# Patient Record
Sex: MALE | Race: WHITE
[De-identification: names, ages, dates, MRNs, and addresses within clinical notes are randomized per-mention and may not be internally consistent; named-entity substitution may affect disease eponyms.]

---

## 2021-02-03 ENCOUNTER — APPOINTMENT (OUTPATIENT)
Dept: UROLOGY | Facility: CLINIC | Age: 46
End: 2021-02-03
Payer: COMMERCIAL

## 2021-02-03 VITALS — TEMPERATURE: 98 F | WEIGHT: 170 LBS | BODY MASS INDEX: 26.68 KG/M2 | HEIGHT: 67 IN

## 2021-02-03 DIAGNOSIS — Z80.41 FAMILY HISTORY OF MALIGNANT NEOPLASM OF OVARY: ICD-10-CM

## 2021-02-03 DIAGNOSIS — Z78.9 OTHER SPECIFIED HEALTH STATUS: ICD-10-CM

## 2021-02-03 DIAGNOSIS — R39.9 UNSPECIFIED SYMPTOMS AND SIGNS INVOLVING THE GENITOURINARY SYSTEM: ICD-10-CM

## 2021-02-03 DIAGNOSIS — Z80.42 FAMILY HISTORY OF MALIGNANT NEOPLASM OF PROSTATE: ICD-10-CM

## 2021-02-03 PROBLEM — Z00.00 ENCOUNTER FOR PREVENTIVE HEALTH EXAMINATION: Status: ACTIVE | Noted: 2021-02-03

## 2021-02-03 PROCEDURE — 99203 OFFICE O/P NEW LOW 30 MIN: CPT

## 2021-02-03 PROCEDURE — 99072 ADDL SUPL MATRL&STAF TM PHE: CPT

## 2021-02-03 NOTE — REVIEW OF SYSTEMS
[Fever] : no fever [Nasal Discharge] : no nasal discharge [Chest Pain] : no chest pain [Shortness Of Breath] : no shortness of breath [Abdominal Pain] : no abdominal pain [Diarrhea] : no diarrhea [Dysuria] : no dysuria [Confused] : no confusion

## 2021-02-03 NOTE — ASSESSMENT
[Urinary Symptom or Sign (788.99\R39.89)] : implantation [FreeTextEntry1] : Family history of prostate cancer we'll send new PSA. Return to office yearly provided PSA okay

## 2021-02-03 NOTE — PHYSICAL EXAM
[General Appearance - In No Acute Distress] : no acute distress [Edema] : no peripheral edema [] : no respiratory distress [Respiration, Rhythm And Depth] : normal respiratory rhythm and effort [Prostate Tenderness] : the prostate was not tender [No Prostate Nodules] : no prostate nodules [Prostate Size ___ (0-4)] : prostate size [unfilled] (scale: 0-4) [Normal Station and Gait] : the gait and station were normal for the patient's age [Oriented To Time, Place, And Person] : oriented to person, place, and time [Not Anxious] : not anxious

## 2021-02-03 NOTE — HISTORY OF PRESENT ILLNESS
[FreeTextEntry1] : 45-year-old with family history of prostate cancer. He had a benign biopsy in 2012 when his PSA was 2.5. He had a 31 cc prostate. PSA in 2017 was 2.6. He has not had one since. He has occasional daytime frequency but other than that no urinary symptoms. He has no flank pain, no hematuria, no dysuria, no nocturia, has a good urinary flow. Prostate symptom score 1/35.

## 2021-02-09 ENCOUNTER — NON-APPOINTMENT (OUTPATIENT)
Age: 46
End: 2021-02-09

## 2021-02-09 DIAGNOSIS — R97.20 ELEVATED PROSTATE, SPECIFIC ANTIGEN [PSA]: ICD-10-CM

## 2021-02-09 LAB — PSA SERPL-MCNC: 3.41 NG/ML

## 2021-02-23 ENCOUNTER — LABORATORY RESULT (OUTPATIENT)
Age: 46
End: 2021-02-23

## 2021-02-23 ENCOUNTER — OUTPATIENT (OUTPATIENT)
Dept: OUTPATIENT SERVICES | Facility: HOSPITAL | Age: 46
LOS: 1 days | Discharge: HOME | End: 2021-02-23

## 2021-02-23 DIAGNOSIS — Z11.59 ENCOUNTER FOR SCREENING FOR OTHER VIRAL DISEASES: ICD-10-CM

## 2021-02-23 LAB — CREAT SERPL-MCNC: 1.1 MG/DL

## 2021-02-26 ENCOUNTER — RESULT REVIEW (OUTPATIENT)
Age: 46
End: 2021-02-26

## 2021-02-26 ENCOUNTER — OUTPATIENT (OUTPATIENT)
Dept: OUTPATIENT SERVICES | Facility: HOSPITAL | Age: 46
LOS: 1 days | Discharge: HOME | End: 2021-02-26
Payer: COMMERCIAL

## 2021-02-26 DIAGNOSIS — R97.20 ELEVATED PROSTATE SPECIFIC ANTIGEN [PSA]: ICD-10-CM

## 2021-02-26 LAB — PSA SERPL-MCNC: 3.83 NG/ML

## 2021-02-26 PROCEDURE — 72197 MRI PELVIS W/O & W/DYE: CPT | Mod: 26

## 2021-03-02 ENCOUNTER — NON-APPOINTMENT (OUTPATIENT)
Age: 46
End: 2021-03-02

## 2021-04-28 LAB — PSA SERPL-MCNC: 3.18 NG/ML

## 2021-04-30 ENCOUNTER — TRANSCRIPTION ENCOUNTER (OUTPATIENT)
Age: 46
End: 2021-04-30

## 2021-05-05 ENCOUNTER — APPOINTMENT (OUTPATIENT)
Dept: UROLOGY | Facility: CLINIC | Age: 46
End: 2021-05-05
Payer: COMMERCIAL

## 2021-05-05 VITALS — BODY MASS INDEX: 26.68 KG/M2 | WEIGHT: 170 LBS | TEMPERATURE: 98.7 F | HEIGHT: 67 IN

## 2021-05-05 PROCEDURE — 99213 OFFICE O/P EST LOW 20 MIN: CPT

## 2021-05-05 PROCEDURE — 99072 ADDL SUPL MATRL&STAF TM PHE: CPT

## 2021-05-05 NOTE — REVIEW OF SYSTEMS
[Fever] : no fever [Feeling Poorly] : not feeling poorly [Feeling Tired] : not feeling tired [Chest Pain] : no chest pain [Cough] : no cough [Constipation] : no constipation [Dysuria] : no dysuria

## 2021-05-05 NOTE — HISTORY OF PRESENT ILLNESS
[FreeTextEntry1] : 46-year-old With family history of prostate cancer. He is a benign biopsy in 2012 and his PSA was 2.5. Prostate was 31 cc none. PSA was 2.6 in 2017.Recently it went as high as 3.8and had an MRI of the prostate which showed no significant lesion. He is now here to discuss repeat PSA which is 3.1.  Prostate was 27 cc on MRI. PSA density 0.11

## 2021-05-05 NOTE — ASSESSMENT
[FreeTextEntry1] : Stable PSA. In light of this and previous negative biopsy, negative MRI and low PSA density recommend continued observation and repeat PSA in 3 months

## 2021-09-08 LAB — PSA SERPL-MCNC: 3.18 NG/ML

## 2021-09-09 ENCOUNTER — APPOINTMENT (OUTPATIENT)
Dept: SURGERY | Facility: CLINIC | Age: 46
End: 2021-09-09
Payer: COMMERCIAL

## 2021-09-09 VITALS
DIASTOLIC BLOOD PRESSURE: 78 MMHG | WEIGHT: 166 LBS | TEMPERATURE: 97.2 F | SYSTOLIC BLOOD PRESSURE: 118 MMHG | BODY MASS INDEX: 26.06 KG/M2 | HEIGHT: 67 IN | HEART RATE: 95 BPM

## 2021-09-09 PROCEDURE — 99202 OFFICE O/P NEW SF 15 MIN: CPT

## 2021-09-09 NOTE — HISTORY OF PRESENT ILLNESS
[de-identified] : The patient is a registered nurse and  who was first told of this mass in the posterior neck about 4 weeks ago. He was unaware of it previously and denies any change in the mass or local symptomatology related to it since then. He was seen by his dermatologist, referred for a sonogram, and then a surgical evaluation here. There is no history of local trauma, infection or insect bite that he can remember.\par \par He has had his Corona virus vaccination in February of this year.

## 2021-09-09 NOTE — ASSESSMENT
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 We discussed the option of observation and surveillance, as opposed to surgical excision for definitive diagnosis. Fine-needle aspiration biopsy was not advised and the rationale for this was discussed. Given his family history he prefers to have this lesion excised and the procedure was explained in full. All his questions were answered and he understands and agrees. A consent was obtained and he will contact the office for scheduling.

## 2021-09-09 NOTE — PHYSICAL EXAM
[Normal Breath Sounds] : Normal breath sounds [Normal Heart Sounds] : normal heart sounds [Normal Rate and Rhythm] : normal rate and rhythm [de-identified] : healthy [de-identified] : no cervical or supraclavicular lymphadenopathy.12 mm diameter discoid subcutaneous mass in the right posterior neck region, slightly firm but mobile without any tenderness or overlying skin changes. No local inflammatory changes are noted.  No axillary adenopathy bilaterally.

## 2021-09-10 ENCOUNTER — APPOINTMENT (OUTPATIENT)
Dept: UROLOGY | Facility: CLINIC | Age: 46
End: 2021-09-10
Payer: COMMERCIAL

## 2021-09-10 VITALS — BODY MASS INDEX: 26.06 KG/M2 | HEIGHT: 67 IN | WEIGHT: 166 LBS

## 2021-09-10 PROCEDURE — 99213 OFFICE O/P EST LOW 20 MIN: CPT

## 2021-10-09 ENCOUNTER — LABORATORY RESULT (OUTPATIENT)
Age: 46
End: 2021-10-09

## 2021-10-12 ENCOUNTER — OUTPATIENT (OUTPATIENT)
Dept: OUTPATIENT SERVICES | Facility: HOSPITAL | Age: 46
LOS: 1 days | Discharge: HOME | End: 2021-10-12

## 2021-10-12 ENCOUNTER — LABORATORY RESULT (OUTPATIENT)
Age: 46
End: 2021-10-12

## 2021-10-12 DIAGNOSIS — Z11.59 ENCOUNTER FOR SCREENING FOR OTHER VIRAL DISEASES: ICD-10-CM

## 2021-10-14 ENCOUNTER — NON-APPOINTMENT (OUTPATIENT)
Age: 46
End: 2021-10-14

## 2021-10-15 ENCOUNTER — RESULT REVIEW (OUTPATIENT)
Age: 46
End: 2021-10-15

## 2021-10-15 ENCOUNTER — OUTPATIENT (OUTPATIENT)
Dept: OUTPATIENT SERVICES | Facility: HOSPITAL | Age: 46
LOS: 1 days | Discharge: HOME | End: 2021-10-15
Payer: COMMERCIAL

## 2021-10-15 VITALS
HEART RATE: 68 BPM | SYSTOLIC BLOOD PRESSURE: 123 MMHG | TEMPERATURE: 96 F | OXYGEN SATURATION: 100 % | RESPIRATION RATE: 17 BRPM | WEIGHT: 166.01 LBS | DIASTOLIC BLOOD PRESSURE: 84 MMHG | HEIGHT: 67 IN

## 2021-10-15 VITALS — SYSTOLIC BLOOD PRESSURE: 116 MMHG | RESPIRATION RATE: 18 BRPM | HEART RATE: 61 BPM | DIASTOLIC BLOOD PRESSURE: 73 MMHG

## 2021-10-15 PROCEDURE — 21555 EXC NECK LES SC < 3 CM: CPT

## 2021-10-15 PROCEDURE — 12032 INTMD RPR S/A/T/EXT 2.6-7.5: CPT | Mod: 59

## 2021-10-15 PROCEDURE — 88304 TISSUE EXAM BY PATHOLOGIST: CPT | Mod: 26

## 2021-10-15 RX ORDER — ACETAMINOPHEN 500 MG
975 TABLET ORAL ONCE
Refills: 0 | Status: DISCONTINUED | OUTPATIENT
Start: 2021-10-15 | End: 2021-10-29

## 2021-10-15 RX ORDER — SODIUM CHLORIDE 9 MG/ML
1000 INJECTION, SOLUTION INTRAVENOUS
Refills: 0 | Status: DISCONTINUED | OUTPATIENT
Start: 2021-10-15 | End: 2021-10-29

## 2021-10-15 RX ORDER — HYDROMORPHONE HYDROCHLORIDE 2 MG/ML
0.5 INJECTION INTRAMUSCULAR; INTRAVENOUS; SUBCUTANEOUS
Refills: 0 | Status: DISCONTINUED | OUTPATIENT
Start: 2021-10-15 | End: 2021-10-15

## 2021-10-15 RX ADMIN — SODIUM CHLORIDE 100 MILLILITER(S): 9 INJECTION, SOLUTION INTRAVENOUS at 12:15

## 2021-10-15 NOTE — BRIEF OPERATIVE NOTE - NSICDXBRIEFPROCEDURE_GEN_ALL_CORE_FT
PROCEDURES:  Surgical removal of benign lesion of back, 1.1 cm to 2.0 cm in diameter 15-Oct-2021 11:45:12  Jas Singh

## 2021-10-15 NOTE — ASU DISCHARGE PLAN (ADULT/PEDIATRIC) - ASU DC SPECIAL INSTRUCTIONSFT
You are being discharged from [facility]. Please call to schedule a follow up appointment with Dr. Guidry as previously discussed in 2 week. For pain please take Tylenol and ibuprofen as needed every 6-8 hours. You may remove your dressing in 48 hours, please keep your Steri-strips in place. You may shower, but do not submerge in a water bath. Please avoid heavy weight lifting for the next 4-6 weeks.  If you have any further questions about your care, please do not hesitate to contact Dr. Guidry's office or return to the Emergency Department.

## 2021-10-15 NOTE — BRIEF OPERATIVE NOTE - CONDITION POST OP
68 Stable Pt with atypical chest pain and no risk factors.  will check labs, ekg, cxr, CTA and reassess.

## 2021-10-15 NOTE — ASU DISCHARGE PLAN (ADULT/PEDIATRIC) - CARE PROVIDER_API CALL
Yeyo Guidry)  Surgery  [address] [place] [address], [address] [place] [address]  Phone: [phone]  Fax: [phone]  Follow Up Time: 2 weeks

## 2021-10-18 LAB — SURGICAL PATHOLOGY STUDY: SIGNIFICANT CHANGE UP

## 2021-10-22 DIAGNOSIS — D17.0 BENIGN LIPOMATOUS NEOPLASM OF SKIN AND SUBCUTANEOUS TISSUE OF HEAD, FACE AND NECK: ICD-10-CM

## 2021-10-22 DIAGNOSIS — N40.0 BENIGN PROSTATIC HYPERPLASIA WITHOUT LOWER URINARY TRACT SYMPTOMS: ICD-10-CM

## 2021-10-25 ENCOUNTER — APPOINTMENT (OUTPATIENT)
Dept: SURGERY | Facility: CLINIC | Age: 46
End: 2021-10-25
Payer: COMMERCIAL

## 2021-10-25 VITALS
TEMPERATURE: 97.8 F | DIASTOLIC BLOOD PRESSURE: 76 MMHG | BODY MASS INDEX: 26.21 KG/M2 | SYSTOLIC BLOOD PRESSURE: 124 MMHG | OXYGEN SATURATION: 98 % | HEART RATE: 69 BPM | WEIGHT: 167 LBS | HEIGHT: 67 IN

## 2021-10-25 DIAGNOSIS — R22.1 LOCALIZED SWELLING, MASS AND LUMP, NECK: ICD-10-CM

## 2021-10-25 PROCEDURE — 99024 POSTOP FOLLOW-UP VISIT: CPT

## 2021-10-25 NOTE — HISTORY OF PRESENT ILLNESS
[de-identified] : The patient returns for his first postop visit following excision of the lipoma of the base of the right neck posteriorly. He has no specific complaints and feels well overall

## 2021-10-25 NOTE — ASSESSMENT
[FreeTextEntry1] : No postoperative problems noted, local care and activity were reviewed. He will avoid massaging the area and use warm moist compresses. No antibiotics will be prescribed at this time. Appropriate precautions were advised that he will return here as need be. All his questions were answered.

## 2021-10-25 NOTE — PHYSICAL EXAM
[de-identified] : Small transverse incision at the base of the right neck posteriorly is clean and dry. There is mild erythema and edema, but he admits to massaging this area to try to flatten it. The wound is not fluctuant or draining.

## 2021-12-08 ENCOUNTER — LABORATORY RESULT (OUTPATIENT)
Age: 46
End: 2021-12-08

## 2021-12-14 ENCOUNTER — APPOINTMENT (OUTPATIENT)
Dept: UROLOGY | Facility: CLINIC | Age: 46
End: 2021-12-14
Payer: COMMERCIAL

## 2021-12-14 DIAGNOSIS — R97.20 ELEVATED PROSTATE, SPECIFIC ANTIGEN [PSA]: ICD-10-CM

## 2021-12-14 PROCEDURE — 99214 OFFICE O/P EST MOD 30 MIN: CPT

## 2021-12-14 NOTE — ASSESSMENT
[FreeTextEntry1] : PSA elevated above his baseline.  Patient will repeat this while abstaining from any ejaculation for a few days prior.  We will get also a creatinine in preparation for possible MRI.  Return to office to discuss results

## 2021-12-14 NOTE — PHYSICAL EXAM
[Prostate Tenderness] : the prostate was not tender [General Appearance - In No Acute Distress] : no acute distress [No Prostate Nodules] : no prostate nodules [Prostate Size ___ (0-4)] : prostate size [unfilled] (scale: 0-4) [Oriented To Time, Place, And Person] : oriented to person, place, and time [Normal Station and Gait] : the gait and station were normal for the patient's age
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
## 2021-12-14 NOTE — REVIEW OF SYSTEMS
[Fever] : no fever [Sore Throat] : no sore throat [Cough] : no cough [Constipation] : no constipation [Confused] : no confusion

## 2022-01-16 LAB
CREAT SERPL-MCNC: 1 MG/DL
PSA SERPL-MCNC: 3.81 NG/ML

## 2022-01-18 ENCOUNTER — APPOINTMENT (OUTPATIENT)
Dept: UROLOGY | Facility: CLINIC | Age: 47
End: 2022-01-18
Payer: COMMERCIAL

## 2022-01-18 VITALS — HEIGHT: 67 IN | WEIGHT: 163 LBS | BODY MASS INDEX: 25.58 KG/M2

## 2022-01-18 PROCEDURE — 99214 OFFICE O/P EST MOD 30 MIN: CPT

## 2022-01-18 NOTE — HISTORY OF PRESENT ILLNESS
[FreeTextEntry1] : 46-year-old with family history of prostate cancer, benign biopsy of his prostate in 2012 and his PSA was 2.5 and his prostate was 31 cc.  His PSA increased to 3.8 in February 2021 and an MRI of the prostate showed no significant lesions.  PSA density was 0.11.  4 months ago his PSA returned to 3.1.  Recently his PSA increased to 5.1 and now on repeat is 3.8.  No change in urination.  Creatinine 1.0.

## 2022-01-18 NOTE — REVIEW OF SYSTEMS
[Feeling Poorly] : not feeling poorly [Feeling Tired] : not feeling tired [Sore Throat] : no sore throat [Chest Pain] : no chest pain [Cough] : no cough [Constipation] : no constipation [Confused] : no confusion

## 2022-01-18 NOTE — ASSESSMENT
[FreeTextEntry1] : Improved PSA.  PSA density remains below 0.15.  History of benign biopsy of the prostate and benign MRIs.  PSA is stable compared to the number when he had a benign MRI in February.  All of the above support benign prostate.  Of concern is his family history of prostate cancer so it needs to watch closely.  We will repeat PSA in 4 months to trend it

## 2022-02-08 ENCOUNTER — APPOINTMENT (OUTPATIENT)
Dept: UROLOGY | Facility: CLINIC | Age: 47
End: 2022-02-08

## 2022-04-26 LAB
CREAT SERPL-MCNC: 1.1 MG/DL
EGFR: 84 ML/MIN/1.73M2

## 2022-04-27 LAB — PSA SERPL-MCNC: 3.63 NG/ML

## 2022-04-29 ENCOUNTER — APPOINTMENT (OUTPATIENT)
Dept: UROLOGY | Facility: CLINIC | Age: 47
End: 2022-04-29
Payer: COMMERCIAL

## 2022-04-29 VITALS — HEIGHT: 67 IN | BODY MASS INDEX: 25.58 KG/M2 | WEIGHT: 163 LBS

## 2022-04-29 DIAGNOSIS — R97.20 ELEVATED PROSTATE, SPECIFIC ANTIGEN [PSA]: ICD-10-CM

## 2022-04-29 PROCEDURE — 99214 OFFICE O/P EST MOD 30 MIN: CPT

## 2022-04-29 NOTE — HISTORY OF PRESENT ILLNESS
[FreeTextEntry1] : 47-year-old with history of BPH, benign prostate biopsy in 2012 when his PSA was 2.5 and his prostate was 31 cc.  Family history of prostate cancer.  In 2021 when his PSA was 3.8 he underwent an MRI of the prostate which showed no significant lesions and a PSA density of 0.11.  PSA fluctuated up to 5.1 4 months ago and on repeat was 3.8.  Its been as low as 3.1 8 months ago.  Current PSA 3.6.  Creatinine 1.1

## 2022-04-29 NOTE — ASSESSMENT
[FreeTextEntry1] : Stable PSA.  Favorable PSA density.  History of negative MRI for suspicious lesions and also benign prostate biopsy.  We will continue to monitor PSA and repeat in 6 months

## 2022-04-29 NOTE — REVIEW OF SYSTEMS
[Fever] : no fever [Sore Throat] : no sore throat [Shortness Of Breath] : no shortness of breath [Confused] : no confusion

## 2022-10-28 LAB — PSA SERPL-MCNC: 3.61 NG/ML

## 2022-11-04 ENCOUNTER — APPOINTMENT (OUTPATIENT)
Dept: UROLOGY | Facility: CLINIC | Age: 47
End: 2022-11-04

## 2022-11-04 VITALS
WEIGHT: 163 LBS | TEMPERATURE: 98 F | SYSTOLIC BLOOD PRESSURE: 124 MMHG | DIASTOLIC BLOOD PRESSURE: 78 MMHG | BODY MASS INDEX: 25.58 KG/M2 | HEIGHT: 67 IN

## 2022-11-04 PROCEDURE — 99214 OFFICE O/P EST MOD 30 MIN: CPT

## 2022-11-04 NOTE — HISTORY OF PRESENT ILLNESS
[FreeTextEntry1] : 47-year-old with history of BPH, benign biopsy in 2012 when his PSA was 2.5 and his prostate was 31 cc.  In 2021 when his PSA was 3.8 he underwent an MRI of the prostate which showed no significant lesions and a PSA density of 0.11.  PSA has fluctuated up to 5.1 10 months ago and his lowest 3.1 14 months ago.  6 months ago PSA was 3.6 and current PSA is stable at 3.6.  No urinary complaint.  There is a family history of prostate cancer.

## 2023-04-02 ENCOUNTER — EMERGENCY (EMERGENCY)
Facility: HOSPITAL | Age: 48
LOS: 0 days | Discharge: HOME | End: 2023-04-02
Attending: EMERGENCY MEDICINE
Payer: OTHER MISCELLANEOUS

## 2023-04-02 VITALS
DIASTOLIC BLOOD PRESSURE: 79 MMHG | HEART RATE: 74 BPM | RESPIRATION RATE: 18 BRPM | OXYGEN SATURATION: 99 % | TEMPERATURE: 97 F | SYSTOLIC BLOOD PRESSURE: 137 MMHG
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
## 2023-04-02 DIAGNOSIS — S93.402A SPRAIN OF UNSPECIFIED LIGAMENT OF LEFT ANKLE, INITIAL ENCOUNTER: ICD-10-CM

## 2023-04-02 DIAGNOSIS — R20.0 ANESTHESIA OF SKIN: ICD-10-CM
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
## 2023-04-02 DIAGNOSIS — R53.1 WEAKNESS: ICD-10-CM
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
## 2023-04-02 DIAGNOSIS — M25.572 PAIN IN LEFT ANKLE AND JOINTS OF LEFT FOOT: ICD-10-CM

## 2023-04-02 DIAGNOSIS — S40.011A CONTUSION OF RIGHT SHOULDER, INITIAL ENCOUNTER: ICD-10-CM

## 2023-04-02 DIAGNOSIS — S40.911A UNSPECIFIED SUPERFICIAL INJURY OF RIGHT SHOULDER, INITIAL ENCOUNTER: ICD-10-CM

## 2023-04-02 PROCEDURE — 73030 X-RAY EXAM OF SHOULDER: CPT | Mod: RT

## 2023-04-02 PROCEDURE — 29515 APPLICATION SHORT LEG SPLINT: CPT | Mod: LT

## 2023-04-02 PROCEDURE — 73590 X-RAY EXAM OF LOWER LEG: CPT | Mod: 26,LT

## 2023-04-02 PROCEDURE — 73030 X-RAY EXAM OF SHOULDER: CPT | Mod: 26,RT

## 2023-04-02 PROCEDURE — 73610 X-RAY EXAM OF ANKLE: CPT | Mod: LT

## 2023-04-02 PROCEDURE — 99284 EMERGENCY DEPT VISIT MOD MDM: CPT | Mod: 25

## 2023-04-02 PROCEDURE — 73590 X-RAY EXAM OF LOWER LEG: CPT | Mod: LT

## 2023-04-02 PROCEDURE — 73610 X-RAY EXAM OF ANKLE: CPT | Mod: 26,LT

## 2023-04-02 RX ORDER — IBUPROFEN 200 MG
800 TABLET ORAL ONCE
Refills: 0 | Status: COMPLETED | OUTPATIENT
Start: 2023-04-02 | End: 2023-04-02

## 2023-04-02 RX ORDER — ACETAMINOPHEN 500 MG
975 TABLET ORAL ONCE
Refills: 0 | Status: COMPLETED | OUTPATIENT
Start: 2023-04-02 | End: 2023-04-02

## 2023-04-02 RX ADMIN — Medication 800 MILLIGRAM(S): at 11:20

## 2023-04-02 RX ADMIN — Medication 975 MILLIGRAM(S): at 11:20

## 2023-04-02 NOTE — ED PROVIDER NOTE - PATIENT PORTAL LINK FT
You can access the FollowMyHealth Patient Portal offered by [facility] by registering at the following website: [email or web address]. By joining "[employer]’s FollowMyHealth portal, you will also be able to view your health information using other applications (apps) compatible with our system.

## 2023-04-02 NOTE — ED PROVIDER NOTE - PHYSICAL EXAMINATION
CONSTITUTIONAL: Well-developed; well-nourished; NAD  SKIN: warm, dry, w/o rash  HEAD: NCAT  EYES: PERRLA, EOMI, no conjunctival injection  ENT: No nasal discharge; nl OP without erythema or exudates  NECK: Supple, non-tender  CARD: nl S1, S2; RRR, no MRG, no JVD  RESP: CTAB, normal respiratory effort  ABD: BS+, soft, NTND, no HSM  EXT: Normal ROM.  No clubbing, cyanosis or edema; +LT lateral mal TTP  NEURO: Alert, oriented, grossly unremarkable  PSYCH: Cooperative, appropriate
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
## 2023-04-02 NOTE — ED ADULT NURSE NOTE - OBJECTIVE STATEMENT
Patient presents to ED for left ankle injury. Patient states "I rolled my ankle at work". Patient c/o 6/10 pain. Patient A&O x4.

## 2023-04-02 NOTE — ED PROVIDER NOTE - NSFOLLOWUPINSTRUCTIONS_ED_ALL_ED_FT
Ankle Sprain    An ankle sprain is a stretch or tear in one of the tough, fiber-like tissues (ligaments) in the ankle. The ligaments in your ankle help to hold the bones of the ankle together.     CAUSES  This condition is often caused by stepping on or falling on the outer edge of the foot.    RISK FACTORS  This condition is more likely to develop in people who play sports.    SYMPTOMS  Symptoms of this condition include:    Pain in your ankle.  Swelling.  Bruising. Bruising may develop right after you sprain your ankle or 1–2 days later.  Trouble standing or walking, especially when you turn or change directions.    DIAGNOSIS  This condition is diagnosed with a physical exam. During the exam, your health care provider will press on certain parts of your foot and ankle and try to move them in certain ways. X-rays may be taken to see how severe the sprain is and to check for broken bones.    TREATMENT  This condition may be treated with:    A brace. This is used to keep the ankle from moving until it heals.  An elastic bandage. This is used to support the ankle.  Crutches.  Pain medicine.  Surgery. This may be needed if the sprain is severe.  Physical therapy. This may help to improve the range of motion in the ankle.    HOME CARE INSTRUCTIONS  Rest your ankle.  Take over-the-counter and prescription medicines only as told by your health care provider.  For 2–3 days, keep your ankle raised (elevated) above the level of your heart as much as possible.  If directed, apply ice to the area:  Put ice in a plastic bag.  Place a towel between your skin and the bag.  Leave the ice on for 20 minutes, 2–3 times a day.  If you were given a brace:  Wear it as directed.  Remove it to shower or bathe.  Try not to move your ankle much, but wiggle your toes from time to time. This helps to prevent swelling.  If you were given an elastic bandage (dressing):  Remove it to shower or bathe.  Try not to move your ankle much, but wiggle your toes from time to time. This helps to prevent swelling.  Adjust the dressing to make it more comfortable if it feels too tight.  Loosen the dressing if you have numbness or tingling in your foot, or if your foot becomes cold and blue.  If you have crutches, use them as told by your health care provider. Continue to use them until you can walk without feeling pain in your ankle.    SEEK MEDICAL CARE IF:  You have rapidly increasing bruising or swelling.  Your pain is not relieved with medicine.    SEEK IMMEDIATE MEDICAL CARE IF:  Your toes or foot becomes numb or blue.  You have severe pain that gets worse.    ADDITIONAL NOTES AND INSTRUCTIONS    Please follow up with your Primary MD in 24-48 hr.  Seek immediate medical care for any new/worsening signs or symptoms.     Ankle Fracture    WHAT YOU NEED TO KNOW:  An ankle fracture is a break in 1 or more of the bones in your ankle. Foot Anatomy     DISCHARGE INSTRUCTIONS:  Call 911 for any of the following:   You feel lightheaded, short of breath, and have chest pain.  You cough up blood.    Return to the emergency department if:   Your leg feels warm, tender, and painful. It may look swollen and red.  Blood soaks through your bandage.  You have severe pain in your ankle.  Your cast feels too tight.  Your foot or toes are cold or numb.  Your foot or toenails turn blue or gray.    Contact your healthcare provider if:   Your splint feels too tight.  Your swelling has increased or returned.  You have a fever.  Your pain does not go away, even after treatment.  You have questions or concerns about your condition or care.    Medicines: You may need any of the following:     Acetaminophen decreases pain and fever. It is available without a doctor's order. Ask how much to take and how often to take it. Follow directions. Acetaminophen can cause liver damage if not taken correctly.    NSAIDs, such as ibuprofen, help decrease swelling, pain, and fever. This medicine is available with or without a doctor's order. NSAIDs can cause stomach bleeding or kidney problems in certain people. If you take blood thinner medicine, always ask your healthcare provider if NSAIDs are safe for you. Always read the medicine label and follow directions.    Prescription pain medicine may be given. Ask your healthcare provider how to take this medicine safely.    Take your medicine as directed. Contact your healthcare provider if you think your medicine is not helping or if you have side effects. Tell him or her if you are allergic to any medicine. Keep a list of the medicines, vitamins, and herbs you take. Include the amounts, and when and why you take them. Bring the list or the pill bottles to follow-up visits. Carry your medicine list with you in case of an emergency.    Follow up with your healthcare provider in 1 to 2 days: Your fracture may need to be reduced (bones pushed back into place) or you may need surgery. Write down your questions so you remember to ask them during your visits.     Support devices: You will be given a brace, cast, or splint to limit your movement and protect your ankle. You may need to use crutches to protect your ankle and decrease your pain as you move around. Do not remove your device and do not put weight on your injured ankle.    Splint and cast care: Cover the splint or cast before you bathe so it does not get wet. Tape 2 plastic trash bags to your skin above the cast. Try to keep your ankle out of the water as much as possible.    Rest: Rest your ankle so that it can heal. Return to normal activities as directed.    Ice: Apply ice on your ankle for 15 to 20 minutes every hour or as directed. Use an ice pack, or put crushed ice in a plastic bag. Cover it with a towel. Ice helps prevent tissue damage and decreases swelling and pain.    Elevate: Elevate your ankle above the level of your heart as often as you can. This will help decrease swelling and pain. Prop your ankle on pillows or blankets to keep it elevated comfortably.

## 2023-04-02 NOTE — ED PROVIDER NOTE - NSPTACCESSSVCSAPPTDETAILS_ED_ALL_ED_FT
Pls assist PT with ortho fu for LT ankle and RT shoulder injury while at work as a , any provider ok

## 2023-04-02 NOTE — ED ADULT NURSE NOTE - PRIMARY CARE PROVIDER
pmd [facility]:  I discussed with the patient the risks of golimumab including but not limited to myelosuppression, immunosuppression, autoimmune hepatitis, demyelinating diseases, lymphoma, and serious infections.  The patient understands that monitoring is required including a PPD at baseline and must alert us or the primary physician if symptoms of infection or other concerning signs are noted.

## 2023-04-02 NOTE — ED PROVIDER NOTE - CARE PROVIDER_API CALL
Polo Woodard)  [facility]  [address] [place] [address] [place] [address]  Phone: [phone]  Fax: [phone]  Follow Up Time: 4-6 Days
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
## 2023-04-02 NOTE — ED PROVIDER NOTE - CLINICAL SUMMARY MEDICAL DECISION MAKING FREE TEXT BOX
A/P; ankle injury sprain, right shoulder contusion–x-ray no acute fracture or dislocation noted on exam, posterior splint applied, DC home with crutches and Ortho follow-up as outpatient 1 to 2 weeks, strict return precautions

## 2023-04-12 PROBLEM — Z78.9 OTHER SPECIFIED HEALTH STATUS: Chronic | Status: ACTIVE | Noted: 2023-04-02

## 2023-04-13 ENCOUNTER — APPOINTMENT (OUTPATIENT)
Dept: ORTHOPEDIC SURGERY | Facility: CLINIC | Age: 48
End: 2023-04-13
Payer: OTHER MISCELLANEOUS

## 2023-04-13 VITALS — HEIGHT: 67 IN | BODY MASS INDEX: 25.11 KG/M2 | WEIGHT: 160 LBS

## 2023-04-13 PROCEDURE — 99213 OFFICE O/P EST LOW 20 MIN: CPT

## 2023-04-13 NOTE — ASSESSMENT
[FreeTextEntry1] :   48-year-old gentleman 1 week status post twisting injury left ankle an axial loading injury to right shoulder.  Physical examination right shoulder suggest rotator cuff strain.  Recommend continued physical therapy focusing on rotator cuff and shoulder girdle strengthening exercises below shoulder height.  Often I will hold off on physical therapy the right shoulder to the pain settles out.  Modalities such as moist heat cryotherapy E stim can be utilized adjunct therapy and may be the focus initially on his care but then as his pain subsides strengthening and range of motion will become the focus.  There is no specific lifting restriction for his right shoulder.\par \par Regarding his left ankle he has clear evidence both by for clinical examination MRI of a significant left ankle sprain.  This would preclude prolonged standing or ambulation.  As a consequence were going to keep him out of work until follow-up in 4 weeks.  As his pain subsides and wanted to start to work on ankle motion exercises.  In 2-3 weeks once he is comfortable enough to walk across his house without the boot he can start physical therapy for his left ankle.  Physical therapy for his ankle work on ankle strengthening exercises and possibly some light stretching exercises.  Modalities again can be utilized adjunct therapy with the focus on teaching him a strengthening and stretching program.  If he has any problems he is welcome back to the office sooner.  All questions were answered to his satisfaction.  Again he remains 100% disabled until follow-up.
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
## 2023-04-13 NOTE — IMAGING
[de-identified] :  Pleasant young man sits comfortably my office in no distress.\par \par Physical examination:\par Right shoulder:  Mild tenderness palpation anterolateral aspect of shoulder.  No tenderness along the biceps groove anteriorly.  Negative speed's test.  Abnormal impingement sign and Adler test.  Negative Leasburg's test.  Normal lift-off test.  Active forward flexion 180° abduction the same.  No axillary lymph nodes.  Normal light sensation axillary nerve distribution.  No bony tenderness along the bony prominences of his shoulder.\par \par Left ankle:  Tenderness palpation along the calcaneal fibular and talar fibular ligament on the lateral aspect of his ankle.  Some mild tenderness along the deep deltoid ligaments of the medial aspect of ankle.  No tenderness along the posterior tibial tendon peroneal tendons were Achilles tendon.  Normal Whipple test.  Midfoot supple and arch maintained.  Resolving ecchymosis noted on the medial aspect of his heel consistent with his injury.  Calf soft no cords.  Normal light sensation dorsal plantar aspects of his foot with 2+ DP pulses.\par \par MRI left ankle reviewed.  This is consistent with a calcaneal fibular and talar fibular ligament sprain.  Is also some evidence of a deep deltoid sprain.  I do not see any clear evidence of tendon rupture.

## 2023-05-01 LAB
CREAT SERPL-MCNC: 1 MG/DL
EGFR: 94 ML/MIN/1.73M2
PSA SERPL-MCNC: 2.89 NG/ML

## 2023-05-05 ENCOUNTER — APPOINTMENT (OUTPATIENT)
Dept: UROLOGY | Facility: CLINIC | Age: 48
End: 2023-05-05
Payer: COMMERCIAL

## 2023-05-05 VITALS
WEIGHT: 160 LBS | HEART RATE: 58 BPM | TEMPERATURE: 97.8 F | SYSTOLIC BLOOD PRESSURE: 104 MMHG | DIASTOLIC BLOOD PRESSURE: 64 MMHG | RESPIRATION RATE: 14 BRPM | OXYGEN SATURATION: 98 % | BODY MASS INDEX: 25.11 KG/M2 | HEIGHT: 67 IN

## 2023-05-05 DIAGNOSIS — R97.20 ELEVATED PROSTATE, SPECIFIC ANTIGEN [PSA]: ICD-10-CM

## 2023-05-05 PROCEDURE — 99213 OFFICE O/P EST LOW 20 MIN: CPT

## 2023-05-05 NOTE — HISTORY OF PRESENT ILLNESS
[FreeTextEntry1] : 48-year-old with early BPH, benign biopsy in 2012 when his PSA was 2.5 with a 31 cc prostate.  In 2021 his PSA was 3.8 and no significant lesions were seen on MRI.  PSA density was 0.11.  Patient has a fluctuating PSA up to 5.1 approximately a year and a half ago and 3.1 approximately 2 years ago.  PSA currently 2.89 compared to 3.6 done 6 months ago.  No urinary complaint

## 2023-05-12 ENCOUNTER — NON-APPOINTMENT (OUTPATIENT)
Age: 48
End: 2023-05-12

## 2023-05-12 ENCOUNTER — APPOINTMENT (OUTPATIENT)
Dept: ORTHOPEDIC SURGERY | Facility: CLINIC | Age: 48
End: 2023-05-12
Payer: OTHER MISCELLANEOUS

## 2023-05-12 DIAGNOSIS — S93.402A SPRAIN OF UNSPECIFIED LIGAMENT OF LEFT ANKLE, INITIAL ENCOUNTER: ICD-10-CM

## 2023-05-12 DIAGNOSIS — S46.911A STRAIN OF UNSPECIFIED MUSCLE, FASCIA AND TENDON AT SHOULDER AND UPPER ARM LEVEL, RIGHT ARM, INITIAL ENCOUNTER: ICD-10-CM

## 2023-05-12 PROCEDURE — 99213 OFFICE O/P EST LOW 20 MIN: CPT

## 2023-05-12 NOTE — IMAGING
[de-identified] :   Pleasant early middle-aged gentleman sits comfortably my office no distress.\par \par Physical examination:\par Right shoulder:  Active forward flexion 170° symmetric.  He still has a somewhat abnormal impingement sign speed's test and Benson's test but sensitivities diminished as is his strength improved.  No axillary lymph nodes.  Normal light sensation axillary nerve distribution.\par \par Left ankle:  Knee minimal if any tenderness palpation over the anterior talofibular ligament.  No posterior tib fib ligament tenderness.  No bony tenderness along the malleoli.  No anterior joint tenderness.  Negative anterior drawer test.  Provocative testing with forced internal rotation does not elicit undue discomfort.  Midfoot remains supple.  Calf soft no cords.  Ankle motion full.\par \par MRI of the patient's right shoulder reviewed.  Agree with radiology report.  There is tendinosis in the subscapularis with undersurface tears.  Small labral tear.  Questionable undersurface tears in supraspinatus.

## 2023-05-12 NOTE — HISTORY OF PRESENT ILLNESS
[de-identified] :   Active 48-year-old gentleman returns for interval follow-up status post right shoulder strain and left ankle sprain 5 weeks ago.  He has participated in a physical therapy program and his function is steadily improved.  He has been worked up with MRIs the results which were consistent with an ankle sprain and undersurface rotator cuff tears with shoulder strain.  His function and has improved and his pain has diminished.  He is eager to return to work.  Does not routinely take any pain medication.  He has been able to go back to the gym.  He is working on a self-directed exercise program currently.

## 2023-05-12 NOTE — ASSESSMENT
[FreeTextEntry1] :   48-year-old  status post right shoulder strain and left ankle sprain.  He does have MRI documented undersurface rotator cuff tears and a small labral tear but I think he has responded enough to physical therapy can return to work.  Similarly his left ankle sprain is essentially resolved.  There are no limitations for his ankle.  He is encouraged to continue self-directed exercise program for his ankle.  Regarding her shoulder out have him follow-up with my partner who is a sports medicine orthopedic surgeon.  If he has persistent complaints of pain about her shoulder he may ultimately require surgery but I think currently he is healed enough that he can return to work and see how he does.  Regarding his ankle he will follow up on an as-needed basis with my office.

## 2023-06-12 ENCOUNTER — APPOINTMENT (OUTPATIENT)
Dept: SURGERY | Facility: CLINIC | Age: 48
End: 2023-06-12

## 2023-08-15 ENCOUNTER — APPOINTMENT (OUTPATIENT)
Dept: ORTHOPEDIC SURGERY | Facility: CLINIC | Age: 48
End: 2023-08-15

## 2023-11-04 ENCOUNTER — LABORATORY RESULT (OUTPATIENT)
Age: 48
End: 2023-11-04

## 2023-11-07 ENCOUNTER — APPOINTMENT (OUTPATIENT)
Dept: UROLOGY | Facility: CLINIC | Age: 48
End: 2023-11-07
Payer: COMMERCIAL

## 2023-11-07 DIAGNOSIS — N40.1 BENIGN PROSTATIC HYPERPLASIA WITH LOWER URINARY TRACT SYMPMS: ICD-10-CM

## 2023-11-07 DIAGNOSIS — N13.8 BENIGN PROSTATIC HYPERPLASIA WITH LOWER URINARY TRACT SYMPMS: ICD-10-CM

## 2023-11-07 DIAGNOSIS — R97.20 ELEVATED PROSTATE, SPECIFIC ANTIGEN [PSA]: ICD-10-CM

## 2023-11-07 PROCEDURE — 99213 OFFICE O/P EST LOW 20 MIN: CPT

## 2024-05-06 ENCOUNTER — LABORATORY RESULT (OUTPATIENT)
Age: 49
End: 2024-05-06

## 2024-07-01 ENCOUNTER — OUTPATIENT (OUTPATIENT)
Dept: OUTPATIENT SERVICES | Facility: HOSPITAL | Age: 49
LOS: 1 days | End: 2024-07-01
Payer: COMMERCIAL

## 2024-07-01 DIAGNOSIS — Z00.8 ENCOUNTER FOR OTHER GENERAL EXAMINATION: ICD-10-CM

## 2024-07-01 DIAGNOSIS — R06.02 SHORTNESS OF BREATH: ICD-10-CM

## 2024-07-01 DIAGNOSIS — R94.39 ABNORMAL RESULT OF OTHER CARDIOVASCULAR FUNCTION STUDY: ICD-10-CM

## 2024-07-01 PROCEDURE — 75574 CT ANGIO HRT W/3D IMAGE: CPT | Mod: 26

## 2024-07-01 PROCEDURE — 75574 CT ANGIO HRT W/3D IMAGE: CPT

## 2024-07-02 DIAGNOSIS — R94.39 ABNORMAL RESULT OF OTHER CARDIOVASCULAR FUNCTION STUDY: ICD-10-CM

## 2024-07-02 DIAGNOSIS — R06.02 SHORTNESS OF BREATH: ICD-10-CM

## 2024-07-05 ENCOUNTER — APPOINTMENT (OUTPATIENT)
Dept: UROLOGY | Facility: CLINIC | Age: 49
End: 2024-07-05
Payer: COMMERCIAL

## 2024-07-05 VITALS
HEART RATE: 114 BPM | TEMPERATURE: 97.5 F | WEIGHT: 160 LBS | BODY MASS INDEX: 25.11 KG/M2 | SYSTOLIC BLOOD PRESSURE: 120 MMHG | HEIGHT: 67 IN | DIASTOLIC BLOOD PRESSURE: 74 MMHG

## 2024-07-05 DIAGNOSIS — N13.8 BENIGN PROSTATIC HYPERPLASIA WITH LOWER URINARY TRACT SYMPMS: ICD-10-CM

## 2024-07-05 DIAGNOSIS — N40.1 BENIGN PROSTATIC HYPERPLASIA WITH LOWER URINARY TRACT SYMPMS: ICD-10-CM

## 2024-07-05 DIAGNOSIS — R97.20 ELEVATED PROSTATE, SPECIFIC ANTIGEN [PSA]: ICD-10-CM

## 2024-07-05 PROCEDURE — 99213 OFFICE O/P EST LOW 20 MIN: CPT

## 2024-07-05 PROCEDURE — G2211 COMPLEX E/M VISIT ADD ON: CPT | Mod: NC

## 2025-07-08 ENCOUNTER — APPOINTMENT (OUTPATIENT)
Dept: UROLOGY | Facility: CLINIC | Age: 50
End: 2025-07-08
Payer: COMMERCIAL

## 2025-07-08 PROCEDURE — 99212 OFFICE O/P EST SF 10 MIN: CPT

## 2025-07-08 PROCEDURE — G2211 COMPLEX E/M VISIT ADD ON: CPT | Mod: NC
